# Patient Record
Sex: FEMALE | Race: BLACK OR AFRICAN AMERICAN | NOT HISPANIC OR LATINO | Employment: OTHER | URBAN - METROPOLITAN AREA
[De-identification: names, ages, dates, MRNs, and addresses within clinical notes are randomized per-mention and may not be internally consistent; named-entity substitution may affect disease eponyms.]

---

## 2018-01-19 ENCOUNTER — TELEPHONE (OUTPATIENT)
Dept: CARDIOLOGY | Facility: CLINIC | Age: 83
End: 2018-01-19

## 2018-01-21 ENCOUNTER — APPOINTMENT (OUTPATIENT)
Dept: CT IMAGING | Facility: HOSPITAL | Age: 83
End: 2018-01-21

## 2018-01-21 ENCOUNTER — HOSPITAL ENCOUNTER (EMERGENCY)
Facility: HOSPITAL | Age: 83
Discharge: HOME | End: 2018-01-22
Attending: EMERGENCY MEDICINE | Admitting: EMERGENCY MEDICINE

## 2018-01-21 ENCOUNTER — APPOINTMENT (OUTPATIENT)
Dept: GENERAL RADIOLOGY | Facility: HOSPITAL | Age: 83
End: 2018-01-21

## 2018-01-21 DIAGNOSIS — Z87.440 HISTORY OF URINARY TRACT INFECTION: ICD-10-CM

## 2018-01-21 DIAGNOSIS — R10.84 GENERALIZED ABDOMINAL PAIN: Primary | ICD-10-CM

## 2018-01-21 DIAGNOSIS — Z86.39 HX OF TYPE 2 DIABETES MELLITUS: ICD-10-CM

## 2018-01-21 LAB
ALBUMIN SERPL-MCNC: 3.7 G/DL (ref 3.2–4.8)
ALBUMIN/GLOB SERPL: 0.9 G/DL (ref 1.5–2.5)
ALP SERPL-CCNC: 108 U/L (ref 25–100)
ALT SERPL W P-5'-P-CCNC: 14 U/L (ref 7–40)
ANION GAP SERPL CALCULATED.3IONS-SCNC: 7 MMOL/L (ref 3–11)
AST SERPL-CCNC: 18 U/L (ref 0–33)
BASOPHILS # BLD AUTO: 0.03 10*3/MM3 (ref 0–0.2)
BASOPHILS NFR BLD AUTO: 0.4 % (ref 0–1)
BILIRUB SERPL-MCNC: 0.5 MG/DL (ref 0.3–1.2)
BILIRUB UR QL STRIP: NEGATIVE
BNP SERPL-MCNC: 22 PG/ML (ref 0–100)
BUN BLD-MCNC: 13 MG/DL (ref 9–23)
BUN/CREAT SERPL: 16.3 (ref 7–25)
CALCIUM SPEC-SCNC: 10.1 MG/DL (ref 8.7–10.4)
CHLORIDE SERPL-SCNC: 104 MMOL/L (ref 99–109)
CLARITY UR: CLEAR
CLUMPED PLATELETS: PRESENT
CO2 SERPL-SCNC: 24 MMOL/L (ref 20–31)
COLOR UR: YELLOW
CREAT BLD-MCNC: 0.8 MG/DL (ref 0.6–1.3)
D-LACTATE SERPL-SCNC: 1.8 MMOL/L (ref 0.5–2)
DEPRECATED RDW RBC AUTO: 44.7 FL (ref 37–54)
EOSINOPHIL # BLD AUTO: 0.17 10*3/MM3 (ref 0–0.3)
EOSINOPHIL NFR BLD AUTO: 2 % (ref 0–3)
ERYTHROCYTE [DISTWIDTH] IN BLOOD BY AUTOMATED COUNT: 13.9 % (ref 11.3–14.5)
GFR SERPL CREATININE-BSD FRML MDRD: 83 ML/MIN/1.73
GLOBULIN UR ELPH-MCNC: 4 GM/DL
GLUCOSE BLD-MCNC: 161 MG/DL (ref 70–100)
GLUCOSE UR STRIP-MCNC: NEGATIVE MG/DL
HCT VFR BLD AUTO: 41.8 % (ref 34.5–44)
HGB BLD-MCNC: 13.6 G/DL (ref 11.5–15.5)
HGB UR QL STRIP.AUTO: NEGATIVE
IMM GRANULOCYTES # BLD: 0.04 10*3/MM3 (ref 0–0.03)
IMM GRANULOCYTES NFR BLD: 0.5 % (ref 0–0.6)
KETONES UR QL STRIP: NEGATIVE
LEUKOCYTE ESTERASE UR QL STRIP.AUTO: NEGATIVE
LYMPHOCYTES # BLD AUTO: 1.75 10*3/MM3 (ref 0.6–4.8)
LYMPHOCYTES NFR BLD AUTO: 20.9 % (ref 24–44)
MCH RBC QN AUTO: 28.5 PG (ref 27–31)
MCHC RBC AUTO-ENTMCNC: 32.5 G/DL (ref 32–36)
MCV RBC AUTO: 87.4 FL (ref 80–99)
MONOCYTES # BLD AUTO: 0.72 10*3/MM3 (ref 0–1)
MONOCYTES NFR BLD AUTO: 8.6 % (ref 0–12)
NEUTROPHILS # BLD AUTO: 5.68 10*3/MM3 (ref 1.5–8.3)
NEUTROPHILS NFR BLD AUTO: 67.6 % (ref 41–71)
NITRITE UR QL STRIP: NEGATIVE
PH UR STRIP.AUTO: <=5 [PH] (ref 5–8)
PLATELET # BLD AUTO: 158 10*3/MM3 (ref 150–450)
PMV BLD AUTO: 10.7 FL (ref 6–12)
POTASSIUM BLD-SCNC: 3.8 MMOL/L (ref 3.5–5.5)
PROT SERPL-MCNC: 7.7 G/DL (ref 5.7–8.2)
PROT UR QL STRIP: NEGATIVE
RBC # BLD AUTO: 4.78 10*6/MM3 (ref 3.89–5.14)
RBC MORPH BLD: NORMAL
SODIUM BLD-SCNC: 135 MMOL/L (ref 132–146)
SP GR UR STRIP: 1.02 (ref 1–1.03)
TROPONIN I SERPL-MCNC: <0.006 NG/ML
UROBILINOGEN UR QL STRIP: NORMAL
WBC MORPH BLD: NORMAL
WBC NRBC COR # BLD: 8.39 10*3/MM3 (ref 3.5–10.8)

## 2018-01-21 PROCEDURE — 85007 BL SMEAR W/DIFF WBC COUNT: CPT | Performed by: NURSE PRACTITIONER

## 2018-01-21 PROCEDURE — 83605 ASSAY OF LACTIC ACID: CPT | Performed by: NURSE PRACTITIONER

## 2018-01-21 PROCEDURE — 93005 ELECTROCARDIOGRAM TRACING: CPT | Performed by: EMERGENCY MEDICINE

## 2018-01-21 PROCEDURE — 74177 CT ABD & PELVIS W/CONTRAST: CPT

## 2018-01-21 PROCEDURE — 25010000002 DIPHENHYDRAMINE PER 50 MG: Performed by: NURSE PRACTITIONER

## 2018-01-21 PROCEDURE — 84484 ASSAY OF TROPONIN QUANT: CPT | Performed by: EMERGENCY MEDICINE

## 2018-01-21 PROCEDURE — 85025 COMPLETE CBC W/AUTO DIFF WBC: CPT | Performed by: NURSE PRACTITIONER

## 2018-01-21 PROCEDURE — 87040 BLOOD CULTURE FOR BACTERIA: CPT | Performed by: NURSE PRACTITIONER

## 2018-01-21 PROCEDURE — 99284 EMERGENCY DEPT VISIT MOD MDM: CPT

## 2018-01-21 PROCEDURE — 0 IOPAMIDOL 61 % SOLUTION: Performed by: EMERGENCY MEDICINE

## 2018-01-21 PROCEDURE — 96374 THER/PROPH/DIAG INJ IV PUSH: CPT

## 2018-01-21 PROCEDURE — 96361 HYDRATE IV INFUSION ADD-ON: CPT

## 2018-01-21 PROCEDURE — 80053 COMPREHEN METABOLIC PANEL: CPT | Performed by: NURSE PRACTITIONER

## 2018-01-21 PROCEDURE — 71046 X-RAY EXAM CHEST 2 VIEWS: CPT

## 2018-01-21 PROCEDURE — 81003 URINALYSIS AUTO W/O SCOPE: CPT | Performed by: NURSE PRACTITIONER

## 2018-01-21 PROCEDURE — 83880 ASSAY OF NATRIURETIC PEPTIDE: CPT | Performed by: NURSE PRACTITIONER

## 2018-01-21 RX ORDER — SODIUM CHLORIDE 0.9 % (FLUSH) 0.9 %
10 SYRINGE (ML) INJECTION AS NEEDED
Status: DISCONTINUED | OUTPATIENT
Start: 2018-01-21 | End: 2018-01-22 | Stop reason: HOSPADM

## 2018-01-21 RX ORDER — DIPHENHYDRAMINE HYDROCHLORIDE 50 MG/ML
25 INJECTION INTRAMUSCULAR; INTRAVENOUS EVERY 6 HOURS PRN
Status: DISCONTINUED | OUTPATIENT
Start: 2018-01-21 | End: 2018-01-22 | Stop reason: HOSPADM

## 2018-01-21 RX ADMIN — IOPAMIDOL 85 ML: 612 INJECTION, SOLUTION INTRAVENOUS at 21:00

## 2018-01-21 RX ADMIN — DIPHENHYDRAMINE HYDROCHLORIDE 25 MG: 50 INJECTION INTRAMUSCULAR; INTRAVENOUS at 22:13

## 2018-01-21 RX ADMIN — SODIUM CHLORIDE 1000 ML: 9 INJECTION, SOLUTION INTRAVENOUS at 20:06

## 2018-01-22 VITALS
WEIGHT: 154 LBS | DIASTOLIC BLOOD PRESSURE: 73 MMHG | SYSTOLIC BLOOD PRESSURE: 123 MMHG | HEART RATE: 87 BPM | OXYGEN SATURATION: 91 % | HEIGHT: 60 IN | TEMPERATURE: 98.4 F | BODY MASS INDEX: 30.23 KG/M2 | RESPIRATION RATE: 20 BRPM

## 2018-01-22 DIAGNOSIS — R00.2 HEART PALPITATIONS: ICD-10-CM

## 2018-01-22 DIAGNOSIS — I47.1 SVT (SUPRAVENTRICULAR TACHYCARDIA) (HCC): Primary | ICD-10-CM

## 2018-01-22 NOTE — ED PROVIDER NOTES
Subjective   HPI Comments: Nancy Trejo is a 83 y.o.female who presents to the ED with c/o abdominal pain with onset 3 days ago. She reports that she developed severe epigastric abd pain described as a soreness. She rates her abd pain as a 5/10 in severity. She states that she has been diagnosed with a UTI, 2 days ago, secondary to her abd pain. She also complains of SOA with onset 3 days ago and dysuria but denies appetite change, fever, CP, N/V/D, constipation, or any other complaints at this time. She states that she was prescribed medication for her diagnosed UTI but notes that her treatment has been ineffective. She states that she has hx of  and hysterectomy.    Patient is a 83 y.o. female presenting with abdominal pain.   History provided by:  Patient  Abdominal Pain   Pain location:  Epigastric  Pain quality: aching    Pain radiates to:  Does not radiate  Pain severity:  Moderate  Onset quality:  Gradual  Timing:  Constant  Progression:  Worsening  Chronicity:  New  Relieved by:  None tried  Worsened by:  Nothing  Ineffective treatments:  None tried  Associated symptoms: dysuria and shortness of breath    Associated symptoms: no chest pain, no constipation, no diarrhea, no fever, no nausea and no vomiting        Review of Systems   Constitutional: Negative for appetite change and fever.   Respiratory: Positive for shortness of breath.    Cardiovascular: Negative for chest pain.   Gastrointestinal: Positive for abdominal pain. Negative for constipation, diarrhea, nausea and vomiting.   Genitourinary: Positive for dysuria.   All other systems reviewed and are negative.      Past Medical History:   Diagnosis Date   • Diabetes mellitus    • H/O chest x-ray 2014    no active disease   • H/O chest x-ray 2014    Trachea is midline. Mild interstitial disease versus pulmonary congestion in bilateral bases, right greater than left which does not appear to be greatly changes from prev exam  12.The remaider of lungs are grossly clear. There is a left subclavian Port- A cath noted. Diaphragms are flattened consistent w/ COPD. degenrative changes of thoracolumbar spine   • H/O chest x-ray 2013    Lung hyperinflated w/ flattened diaphragms consistent w/ COPD. Cardiac silhoutte at upper limits of normal. Port A Cath overlying left chest that appears to be entering left subclavian vein & ends at cavoatrial junction. Scattered Pulmonary nodules, primarily right hilar region, consistent w/ prio granulomatous infection. Lungs grossly clear of dominant nodules, infiltrates or massesNSCfrom2012   • History of PFTs 2014    Moderate to severe obstruction with BD response. positive air trapping, DLCO decreased   • History of PFTs 2013    Moderate obstruction, no ABDR   • History of PFTs 2012    PFT acceptable and reproducible. Pt given 3 puffs xopenex. Pt had difficult time panting during plethysmography, attempted 2 times,pt  unable to complete. DLCO best effort. Best of pt ability       No Known Allergies    Past Surgical History:   Procedure Laterality Date   •  SECTION     • HYSTERECTOMY      Removal of both ovaries-secondary to uterine  cancer       History reviewed. No pertinent family history.    Social History     Social History   • Marital status: Single     Spouse name: N/A   • Number of children: N/A   • Years of education: N/A     Social History Main Topics   • Smoking status: Former Smoker     Packs/day: 1.00     Years: 50.00     Types: Cigarettes     Quit date: 2012   • Smokeless tobacco: None   • Alcohol use No      Comment: PT REPORTS NOT DRINKING SINCE    • Drug use: No   • Sexual activity: Defer     Other Topics Concern   • None     Social History Narrative   • None         Objective   Physical Exam   Constitutional: She is oriented to person, place, and time. She appears well-developed and well-nourished. No distress.   HENT:   Head: Normocephalic and  atraumatic.   Nose: Nose normal.   Eyes: Conjunctivae are normal. No scleral icterus.   Neck: Normal range of motion. Neck supple.   Cardiovascular: Normal rate, regular rhythm and normal heart sounds.    No murmur heard.  Pulmonary/Chest: Effort normal and breath sounds normal. No respiratory distress.   Lungs are clear.   Abdominal: Soft. Bowel sounds are normal. There is no tenderness.   The pt reports subjective mild diffuse pain. No tenderness upon examination. No CVA tenderness.   Musculoskeletal: She exhibits no edema.   Neurological: She is alert and oriented to person, place, and time.   Skin: Skin is warm and dry.   Psychiatric: She has a normal mood and affect. Her behavior is normal.   Nursing note and vitals reviewed.      Procedures         ED Course  ED Course   Comment By Time   The pt developed a red itchy patch on her left cheek that is slightly pruritic. This errupted shortly after her IV dye procedure. The pt does not have any respiratory distress. Vital signs are stable. Jose Cintron 01/21 2118   Spoke to Dr. Santana about the pt. He feels that the pt is safe to be discharged. Jose Cintron 01/21 4390     Recent Results (from the past 24 hour(s))   Comprehensive Metabolic Panel    Collection Time: 01/21/18  8:01 PM   Result Value Ref Range    Glucose 161 (H) 70 - 100 mg/dL    BUN 13 9 - 23 mg/dL    Creatinine 0.80 0.60 - 1.30 mg/dL    Sodium 135 132 - 146 mmol/L    Potassium 3.8 3.5 - 5.5 mmol/L    Chloride 104 99 - 109 mmol/L    CO2 24.0 20.0 - 31.0 mmol/L    Calcium 10.1 8.7 - 10.4 mg/dL    Total Protein 7.7 5.7 - 8.2 g/dL    Albumin 3.70 3.20 - 4.80 g/dL    ALT (SGPT) 14 7 - 40 U/L    AST (SGOT) 18 0 - 33 U/L    Alkaline Phosphatase 108 (H) 25 - 100 U/L    Total Bilirubin 0.5 0.3 - 1.2 mg/dL    eGFR  African Amer 83 >60 mL/min/1.73    Globulin 4.0 gm/dL    A/G Ratio 0.9 (L) 1.5 - 2.5 g/dL    BUN/Creatinine Ratio 16.3 7.0 - 25.0    Anion Gap 7.0 3.0 - 11.0 mmol/L   BNP     Collection Time: 01/21/18  8:01 PM   Result Value Ref Range    BNP 22.0 0.0 - 100.0 pg/mL   Lactic Acid, Plasma    Collection Time: 01/21/18  8:01 PM   Result Value Ref Range    Lactate 1.8 0.5 - 2.0 mmol/L   CBC Auto Differential    Collection Time: 01/21/18  8:01 PM   Result Value Ref Range    WBC 8.39 3.50 - 10.80 10*3/mm3    RBC 4.78 3.89 - 5.14 10*6/mm3    Hemoglobin 13.6 11.5 - 15.5 g/dL    Hematocrit 41.8 34.5 - 44.0 %    MCV 87.4 80.0 - 99.0 fL    MCH 28.5 27.0 - 31.0 pg    MCHC 32.5 32.0 - 36.0 g/dL    RDW 13.9 11.3 - 14.5 %    RDW-SD 44.7 37.0 - 54.0 fl    MPV 10.7 6.0 - 12.0 fL    Platelets 158 150 - 450 10*3/mm3    Neutrophil % 67.6 41.0 - 71.0 %    Lymphocyte % 20.9 (L) 24.0 - 44.0 %    Monocyte % 8.6 0.0 - 12.0 %    Eosinophil % 2.0 0.0 - 3.0 %    Basophil % 0.4 0.0 - 1.0 %    Immature Grans % 0.5 0.0 - 0.6 %    Neutrophils, Absolute 5.68 1.50 - 8.30 10*3/mm3    Lymphocytes, Absolute 1.75 0.60 - 4.80 10*3/mm3    Monocytes, Absolute 0.72 0.00 - 1.00 10*3/mm3    Eosinophils, Absolute 0.17 0.00 - 0.30 10*3/mm3    Basophils, Absolute 0.03 0.00 - 0.20 10*3/mm3    Immature Grans, Absolute 0.04 (H) 0.00 - 0.03 10*3/mm3   Scan Slide    Collection Time: 01/21/18  8:01 PM   Result Value Ref Range    RBC Morphology Normal Normal    WBC Morphology Normal Normal    Clumped Platelets Present None Seen   Troponin    Collection Time: 01/21/18  8:01 PM   Result Value Ref Range    Troponin I <0.006 <=0.039 ng/mL   Urinalysis With / Culture If Indicated - Urine, Catheter    Collection Time: 01/21/18  8:12 PM   Result Value Ref Range    Color, UA Yellow Yellow, Straw    Appearance, UA Clear Clear    pH, UA <=5.0 5.0 - 8.0    Specific Gravity, UA 1.016 1.001 - 1.030    Glucose, UA Negative Negative    Ketones, UA Negative Negative    Bilirubin, UA Negative Negative    Blood, UA Negative Negative    Protein, UA Negative Negative    Leuk Esterase, UA Negative Negative    Nitrite, UA Negative Negative    Urobilinogen, UA 0.2  "E.U./dL 0.2 - 1.0 E.U./dL     Note: In addition to lab results from this visit, the labs listed above may include labs taken at another facility or during a different encounter within the last 24 hours. Please correlate lab times with ED admission and discharge times for further clarification of the services performed during this visit.    CT Abdomen Pelvis With Contrast   Final Result   1.  Consider US to rule out high density GB bile versus gravel cholelithiasis.   2.  Unruptured aneurysms of abdominal aorta and thoracic aorta.   3.  Nonspecific bowel gas pattern without dilatation or obstruction.   4.  Colonic diverticulosis.   5.  Small left renal nodule.   6.  Small cyst or dilated ductal segment at pancreatic head.  Consider US.   7.  Mild left inguinal lymphadenopathy.      THIS DOCUMENT HAS BEEN ELECTRONICALLY SIGNED BY JAKOB VOGT MD      XR Chest 2 View   Final Result     Chronic cardiopulmonary changes without evidence of an active lung    parenchymal lesion.         Tortuous thoracic aorta with probable aneurysm of the aortic arch/proximal    thoracic aorta.       THIS DOCUMENT HAS BEEN ELECTRONICALLY SIGNED BY SWATI BLACK MD        Vitals:    01/21/18 1927 01/21/18 2015 01/21/18 2114 01/21/18 2130   BP: (!) 141/109 147/82 131/69 150/63   BP Location: Left arm      Patient Position: Lying      Pulse: 91 81  83   Resp: 20      Temp: 98.4 °F (36.9 °C)      TempSrc: Oral      SpO2: 93% 97%  95%   Weight: 69.9 kg (154 lb)      Height: 152.4 cm (60\")        Medications   sodium chloride 0.9 % flush 10 mL (not administered)   diphenhydrAMINE (BENADRYL) injection 25 mg (25 mg Intravenous Given 1/21/18 2213)   sodium chloride 0.9 % bolus 1,000 mL (0 mL Intravenous Stopped 1/21/18 2106)   iopamidol (ISOVUE-300) 61 % injection 100 mL (85 mL Intravenous Given 1/21/18 2100)     ECG/EMG Results (last 24 hours)     Procedure Component Value Units Date/Time    ECG 12 Lead [[id number]] Collected:  01/21/18 1931     " Updated:  01/21/18 1933                        [facility]    Final diagnoses:   Generalized abdominal pain   History of urinary tract infection   Hx of type 2 diabetes mellitus       Documentation assistance provided by lizbet Cintron.  Information recorded by the catrachoibjulissa was done at my direction and has been verified and validated by me.     Jose Cintron  01/21/18 2007       Lexy Puentes, KATHI  01/21/18 2447

## 2018-01-22 NOTE — DISCHARGE INSTRUCTIONS
Continue the antibiotics by mouth for the recent urine infection      Discuss the abnormal findings on your aorta with your cardiologist tomorrow and follow up with vascular surgeon, Dr. Green to observe and monitor these findings routinely.      Return to the ER as needed for worsening symptoms or concerns.  Thank You

## 2018-01-26 LAB
BACTERIA SPEC AEROBE CULT: NORMAL
BACTERIA SPEC AEROBE CULT: NORMAL

## 2018-01-31 ENCOUNTER — APPOINTMENT (OUTPATIENT)
Dept: CT IMAGING | Facility: HOSPITAL | Age: 83
End: 2018-01-31

## 2018-01-31 ENCOUNTER — APPOINTMENT (OUTPATIENT)
Dept: GENERAL RADIOLOGY | Facility: HOSPITAL | Age: 83
End: 2018-01-31

## 2018-01-31 ENCOUNTER — HOSPITAL ENCOUNTER (EMERGENCY)
Facility: HOSPITAL | Age: 83
Discharge: HOME | End: 2018-01-31
Attending: EMERGENCY MEDICINE | Admitting: EMERGENCY MEDICINE

## 2018-01-31 ENCOUNTER — APPOINTMENT (OUTPATIENT)
Dept: ULTRASOUND IMAGING | Facility: HOSPITAL | Age: 83
End: 2018-01-31

## 2018-01-31 VITALS
DIASTOLIC BLOOD PRESSURE: 78 MMHG | SYSTOLIC BLOOD PRESSURE: 128 MMHG | RESPIRATION RATE: 20 BRPM | BODY MASS INDEX: 29.23 KG/M2 | WEIGHT: 165 LBS | HEART RATE: 76 BPM | OXYGEN SATURATION: 96 % | HEIGHT: 63 IN | TEMPERATURE: 97.4 F

## 2018-01-31 DIAGNOSIS — M79.2 NEUROPATHIC PAIN: Primary | ICD-10-CM

## 2018-01-31 LAB
ALBUMIN SERPL-MCNC: 3.8 G/DL (ref 3.2–4.8)
ALBUMIN/GLOB SERPL: 1 G/DL (ref 1.5–2.5)
ALP SERPL-CCNC: 90 U/L (ref 25–100)
ALT SERPL W P-5'-P-CCNC: 17 U/L (ref 7–40)
ANION GAP SERPL CALCULATED.3IONS-SCNC: 10 MMOL/L (ref 3–11)
AST SERPL-CCNC: 20 U/L (ref 0–33)
BASOPHILS # BLD AUTO: 0.03 10*3/MM3 (ref 0–0.2)
BASOPHILS NFR BLD AUTO: 0.4 % (ref 0–1)
BILIRUB SERPL-MCNC: 0.6 MG/DL (ref 0.3–1.2)
BILIRUB UR QL STRIP: NEGATIVE
BUN BLD-MCNC: 8 MG/DL (ref 9–23)
BUN/CREAT SERPL: 11.4 (ref 7–25)
CALCIUM SPEC-SCNC: 9.9 MG/DL (ref 8.7–10.4)
CHLORIDE SERPL-SCNC: 101 MMOL/L (ref 99–109)
CLARITY UR: CLEAR
CO2 SERPL-SCNC: 24 MMOL/L (ref 20–31)
COLOR UR: YELLOW
CREAT BLD-MCNC: 0.7 MG/DL (ref 0.6–1.3)
DEPRECATED RDW RBC AUTO: 45.1 FL (ref 37–54)
EOSINOPHIL # BLD AUTO: 0.06 10*3/MM3 (ref 0–0.3)
EOSINOPHIL NFR BLD AUTO: 0.8 % (ref 0–3)
ERYTHROCYTE [DISTWIDTH] IN BLOOD BY AUTOMATED COUNT: 14.2 % (ref 11.3–14.5)
GFR SERPL CREATININE-BSD FRML MDRD: 97 ML/MIN/1.73
GLOBULIN UR ELPH-MCNC: 3.7 GM/DL
GLUCOSE BLD-MCNC: 108 MG/DL (ref 70–100)
GLUCOSE UR STRIP-MCNC: NEGATIVE MG/DL
HCT VFR BLD AUTO: 43 % (ref 34.5–44)
HGB BLD-MCNC: 14.2 G/DL (ref 11.5–15.5)
HGB UR QL STRIP.AUTO: NEGATIVE
HOLD SPECIMEN: NORMAL
HOLD SPECIMEN: NORMAL
IMM GRANULOCYTES # BLD: 0.02 10*3/MM3 (ref 0–0.03)
IMM GRANULOCYTES NFR BLD: 0.3 % (ref 0–0.6)
KETONES UR QL STRIP: NEGATIVE
LEUKOCYTE ESTERASE UR QL STRIP.AUTO: NEGATIVE
LIPASE SERPL-CCNC: 27 U/L (ref 6–51)
LYMPHOCYTES # BLD AUTO: 1.96 10*3/MM3 (ref 0.6–4.8)
LYMPHOCYTES NFR BLD AUTO: 25 % (ref 24–44)
MCH RBC QN AUTO: 28.9 PG (ref 27–31)
MCHC RBC AUTO-ENTMCNC: 33 G/DL (ref 32–36)
MCV RBC AUTO: 87.6 FL (ref 80–99)
MONOCYTES # BLD AUTO: 0.77 10*3/MM3 (ref 0–1)
MONOCYTES NFR BLD AUTO: 9.8 % (ref 0–12)
NEUTROPHILS # BLD AUTO: 4.99 10*3/MM3 (ref 1.5–8.3)
NEUTROPHILS NFR BLD AUTO: 63.7 % (ref 41–71)
NITRITE UR QL STRIP: NEGATIVE
PH UR STRIP.AUTO: 6 [PH] (ref 5–8)
PLATELET # BLD AUTO: 260 10*3/MM3 (ref 150–450)
PMV BLD AUTO: 10 FL (ref 6–12)
POTASSIUM BLD-SCNC: 3.9 MMOL/L (ref 3.5–5.5)
PROT SERPL-MCNC: 7.5 G/DL (ref 5.7–8.2)
PROT UR QL STRIP: NEGATIVE
RBC # BLD AUTO: 4.91 10*6/MM3 (ref 3.89–5.14)
SODIUM BLD-SCNC: 135 MMOL/L (ref 132–146)
SP GR UR STRIP: 1.01 (ref 1–1.03)
TROPONIN I SERPL-MCNC: <0.006 NG/ML
UROBILINOGEN UR QL STRIP: NORMAL
WBC NRBC COR # BLD: 7.83 10*3/MM3 (ref 3.5–10.8)
WHOLE BLOOD HOLD SPECIMEN: NORMAL
WHOLE BLOOD HOLD SPECIMEN: NORMAL

## 2018-01-31 PROCEDURE — 84484 ASSAY OF TROPONIN QUANT: CPT | Performed by: EMERGENCY MEDICINE

## 2018-01-31 PROCEDURE — 71045 X-RAY EXAM CHEST 1 VIEW: CPT

## 2018-01-31 PROCEDURE — 80053 COMPREHEN METABOLIC PANEL: CPT | Performed by: EMERGENCY MEDICINE

## 2018-01-31 PROCEDURE — 76705 ECHO EXAM OF ABDOMEN: CPT

## 2018-01-31 PROCEDURE — 99284 EMERGENCY DEPT VISIT MOD MDM: CPT

## 2018-01-31 PROCEDURE — 93005 ELECTROCARDIOGRAM TRACING: CPT | Performed by: EMERGENCY MEDICINE

## 2018-01-31 PROCEDURE — 81003 URINALYSIS AUTO W/O SCOPE: CPT | Performed by: EMERGENCY MEDICINE

## 2018-01-31 PROCEDURE — 93005 ELECTROCARDIOGRAM TRACING: CPT

## 2018-01-31 PROCEDURE — 70450 CT HEAD/BRAIN W/O DYE: CPT

## 2018-01-31 PROCEDURE — 85025 COMPLETE CBC W/AUTO DIFF WBC: CPT | Performed by: EMERGENCY MEDICINE

## 2018-01-31 PROCEDURE — 83690 ASSAY OF LIPASE: CPT | Performed by: EMERGENCY MEDICINE

## 2018-01-31 RX ORDER — SODIUM CHLORIDE 0.9 % (FLUSH) 0.9 %
10 SYRINGE (ML) INJECTION AS NEEDED
Status: DISCONTINUED | OUTPATIENT
Start: 2018-01-31 | End: 2018-01-31 | Stop reason: HOSPADM

## 2018-01-31 RX ORDER — GABAPENTIN 300 MG/1
300 CAPSULE ORAL 2 TIMES DAILY
Qty: 30 CAPSULE | Refills: 0 | Status: SHIPPED | OUTPATIENT
Start: 2018-01-31

## 2018-02-07 ENCOUNTER — OFFICE VISIT (OUTPATIENT)
Dept: CARDIOLOGY | Facility: CLINIC | Age: 83
End: 2018-02-07

## 2018-02-07 ENCOUNTER — HOSPITAL ENCOUNTER (OUTPATIENT)
Dept: CARDIOLOGY | Facility: HOSPITAL | Age: 83
Discharge: HOME | End: 2018-02-07
Attending: INTERNAL MEDICINE | Admitting: INTERNAL MEDICINE

## 2018-02-07 VITALS
HEIGHT: 63 IN | BODY MASS INDEX: 28.53 KG/M2 | SYSTOLIC BLOOD PRESSURE: 128 MMHG | DIASTOLIC BLOOD PRESSURE: 70 MMHG | HEART RATE: 72 BPM | WEIGHT: 161 LBS

## 2018-02-07 DIAGNOSIS — I10 ESSENTIAL HYPERTENSION: ICD-10-CM

## 2018-02-07 DIAGNOSIS — E78.00 HYPERCHOLESTEROLEMIA: ICD-10-CM

## 2018-02-07 DIAGNOSIS — R00.2 HEART PALPITATIONS: ICD-10-CM

## 2018-02-07 DIAGNOSIS — R00.0 TACHYCARDIA: Primary | ICD-10-CM

## 2018-02-07 DIAGNOSIS — I71.40 ABDOMINAL AORTIC ANEURYSM (AAA) WITHOUT RUPTURE (HCC): ICD-10-CM

## 2018-02-07 LAB
BH CV ECHO MEAS - AO ROOT AREA (BSA CORRECTED): 1.5
BH CV ECHO MEAS - AO ROOT AREA: 5.7 CM^2
BH CV ECHO MEAS - AO ROOT DIAM: 2.7 CM
BH CV ECHO MEAS - BSA(HAYCOCK): 1.8 M^2
BH CV ECHO MEAS - BSA: 1.8 M^2
BH CV ECHO MEAS - BZI_BMI: 29.2 KILOGRAMS/M^2
BH CV ECHO MEAS - BZI_METRIC_HEIGHT: 160 CM
BH CV ECHO MEAS - BZI_METRIC_WEIGHT: 74.8 KG
BH CV ECHO MEAS - CONTRAST EF (2CH): 42.9 ML/M^2
BH CV ECHO MEAS - CONTRAST EF 4CH: 41.5 ML/M^2
BH CV ECHO MEAS - EDV(CUBED): 41.8 ML
BH CV ECHO MEAS - EDV(MOD-SP2): 56 ML
BH CV ECHO MEAS - EDV(MOD-SP4): 53 ML
BH CV ECHO MEAS - EDV(TEICH): 49.8 ML
BH CV ECHO MEAS - EF(CUBED): 45.8 %
BH CV ECHO MEAS - EF(MOD-SP2): 42.9 %
BH CV ECHO MEAS - EF(TEICH): 39.1 %
BH CV ECHO MEAS - ESV(CUBED): 22.7 ML
BH CV ECHO MEAS - ESV(MOD-SP2): 32 ML
BH CV ECHO MEAS - ESV(MOD-SP4): 31 ML
BH CV ECHO MEAS - ESV(TEICH): 30.3 ML
BH CV ECHO MEAS - FS: 18.4 %
BH CV ECHO MEAS - IVS/LVPW: 1
BH CV ECHO MEAS - IVSD: 0.96 CM
BH CV ECHO MEAS - LA DIMENSION: 2.7 CM
BH CV ECHO MEAS - LA/AO: 1
BH CV ECHO MEAS - LAT PEAK E' VEL: 6.8 CM/SEC
BH CV ECHO MEAS - LV DIASTOLIC VOL/BSA (35-75): 29.7 ML/M^2
BH CV ECHO MEAS - LV MASS(C)D: 96.7 GRAMS
BH CV ECHO MEAS - LV MASS(C)DI: 54.3 GRAMS/M^2
BH CV ECHO MEAS - LV SYSTOLIC VOL/BSA (12-30): 17.4 ML/M^2
BH CV ECHO MEAS - LVIDD: 3.5 CM
BH CV ECHO MEAS - LVIDS: 2.8 CM
BH CV ECHO MEAS - LVLD AP2: 6.6 CM
BH CV ECHO MEAS - LVLD AP4: 6.3 CM
BH CV ECHO MEAS - LVLS AP2: 5.7 CM
BH CV ECHO MEAS - LVLS AP4: 6.3 CM
BH CV ECHO MEAS - LVPWD: 0.96 CM
BH CV ECHO MEAS - MED PEAK E' VEL: 4.5 CM/SEC
BH CV ECHO MEAS - MV A MAX VEL: 93.3 CM/SEC
BH CV ECHO MEAS - MV DEC TIME: 0.39 SEC
BH CV ECHO MEAS - MV E MAX VEL: 50.8 CM/SEC
BH CV ECHO MEAS - MV E/A: 0.54
BH CV ECHO MEAS - RAP SYSTOLE: 5 MMHG
BH CV ECHO MEAS - RVDD: 2.3 CM
BH CV ECHO MEAS - RVSP: 34 MMHG
BH CV ECHO MEAS - SI(CUBED): 10.7 ML/M^2
BH CV ECHO MEAS - SI(MOD-SP2): 13.5 ML/M^2
BH CV ECHO MEAS - SI(MOD-SP4): 12.3 ML/M^2
BH CV ECHO MEAS - SI(TEICH): 10.9 ML/M^2
BH CV ECHO MEAS - SV(CUBED): 19.1 ML
BH CV ECHO MEAS - SV(MOD-SP2): 24 ML
BH CV ECHO MEAS - SV(MOD-SP4): 22 ML
BH CV ECHO MEAS - SV(TEICH): 19.5 ML
BH CV ECHO MEAS - TAPSE (>1.6): 1.8 CM2
BH CV ECHO MEAS - TR MAX VEL: 270 CM/SEC
BH CV VAS BP RIGHT ARM: NORMAL MMHG
BH CV XLRA - RV BASE: 3.1 CM
BH CV XLRA - RV LENGTH: 5.9 CM
BH CV XLRA - RV MID: 2.5 CM
E/E' RATIO: 5.6
MAXIMAL PREDICTED HEART RATE: 137 BPM
STRESS TARGET HR: 116 BPM

## 2018-02-07 PROCEDURE — 93306 TTE W/DOPPLER COMPLETE: CPT

## 2018-02-07 PROCEDURE — 25010000002 SULFUR HEXAFLUORIDE MICROSPH 60.7-25 MG RECONSTITUTED SUSPENSION: Performed by: INTERNAL MEDICINE

## 2018-02-07 PROCEDURE — 99204 OFFICE O/P NEW MOD 45 MIN: CPT | Performed by: INTERNAL MEDICINE

## 2018-02-07 PROCEDURE — 93306 TTE W/DOPPLER COMPLETE: CPT | Performed by: INTERNAL MEDICINE

## 2018-02-07 RX ORDER — LEVOFLOXACIN 500 MG/1
TABLET, FILM COATED ORAL
Refills: 0 | COMMUNITY
Start: 2018-01-18 | End: 2018-03-19

## 2018-02-07 RX ORDER — METOPROLOL SUCCINATE 25 MG/1
TABLET, EXTENDED RELEASE ORAL
Refills: 0 | COMMUNITY
Start: 2018-01-19 | End: 2018-03-19 | Stop reason: SDUPTHER

## 2018-02-07 RX ORDER — ASPIRIN 81 MG/1
81 TABLET ORAL DAILY
Start: 2018-02-07

## 2018-02-07 RX ORDER — NITROFURANTOIN 25; 75 MG/1; MG/1
CAPSULE ORAL
Refills: 0 | COMMUNITY
Start: 2018-01-10 | End: 2018-03-19

## 2018-02-07 NOTE — PROGRESS NOTES
[facility] at [facility]  Consultation H&P  Nancy Trejo  1934  [phone]    VISIT DATE:  02/07/2018    PCP: ALEKSANDRA Anders MD  [facility] [address]    CC:  Chief Complaint   Patient presents with   • Chest Pain     New Patient   • Shortness of Breath   • Irregular Heart Beat       ASSESSMENT:   Diagnosis Plan   1. Tachycardia  Cardiac Event Monitor   2. Essential hypertension     3. Hypercholesterolemia     4. Abdominal aortic aneurysm (AAA) without rupture         PLAN:  Starting aspirin 81 mg by mouth daily for primary prevention of stroke and myocardial infarction  Continue beta-blockade  30 day event monitor to screen for underlying atrial fibrillation and atrial flutter  Follow-up abdominal ultrasound in 6 months to verify stability of small abdominal aortic aneurysm, then annually afterward  Continue statin  Afterload currently well-controlled.    History of Present Illness   83-year-old female was recently noted to have resting tachycardia upon evaluation her primary care physician's office.  Heart rates are running in the 120 to 1:30 beat per minute range.  She was essentially completely asymptomatic.  She denies palpitations, chest pain or shortness of breath.  She recently had issues with abdominal discomfort and diffuse body aches for which evaluation was unremarkable.  CT abdomen did reveal a small abdominal aortic aneurysm with associated mural thrombus.  She has a previous 40-pack-year history of smoking.  Blood pressures running less than 140/90 mmHg.  Reviewed transthoracic echo today which did not reveal any hemodynamically significant valvular heart disease and revealed preserved left ventricular systolic function.  Holter monitor all are revealed frequent premature atrial contractions with otherwise normal resting sinus rate.    PHYSICAL EXAMINATION:  Vitals:    02/07/18 1252   BP: 128/70   BP Location: Right arm   Patient Position: Sitting  "  Pulse: 72   Weight: 73 kg (161 lb)   Height: 160 cm (63\")     General Appearance:    Alert, cooperative, no distress, appears stated age   Head:    Normocephalic, without obvious abnormality, atraumatic   Eyes:    conjunctiva/corneas clear, EOM's intact, fundi     benign, both eyes   Ears:    Normal TM's and external ear canals, both ears   Nose:   Nares normal, septum midline, mucosa normal, no drainage    or sinus tenderness   Throat:   Lips, mucosa, and tongue normal; teeth and gums normal   Neck:   Supple, symmetrical, trachea midline, no adenopathy;     thyroid:  no enlargement/tenderness/nodules; no carotid    bruit or JVD   Back:     Symmetric, no curvature, ROM normal, no CVA tenderness   Lungs:     Clear to auscultation bilaterally, respirations unlabored   Chest Wall:    No tenderness or deformity    Heart:    Regular rate and rhythm, S1 and S2 normal, no murmur, rub   or gallop, normal carotid impulse bilaterally without bruit.   Abdomen:     Soft, non-tender, bowel sounds active all four quadrants,     no masses, no organomegaly   Extremities:   Extremities normal, atraumatic, no cyanosis or edema   Pulses:   2+ and symmetric all extremities   Skin:   Skin color, texture, turgor normal, no rashes or lesions   Lymph nodes:   Cervical, supraclavicular, and axillary nodes normal   Neurologic:   normal strength, sensation intact     throughout       Diagnostic Data:  Procedures  No results found for: CHLPL, TRIG, HDL, LDLDIRECT  Lab Results   Component Value Date    GLUCOSE 108 (H) 01/31/2018    BUN 8 (L) 01/31/2018    CREATININE 0.70 01/31/2018     01/31/2018    K 3.9 01/31/2018     01/31/2018    CO2 24.0 01/31/2018     No results found for: HGBA1C  Lab Results   Component Value Date    WBC 7.83 01/31/2018    HGB 14.2 01/31/2018    HCT 43.0 01/31/2018     01/31/2018       PROBLEM LIST:  Patient Active Problem List   Diagnosis   • COPD (chronic obstructive pulmonary disease)   • " Dependence on supplemental oxygen   • Diabetes mellitus   • Hypercholesterolemia   • Hypertension   • Uterine cancer   • Post-nasal drip   • Cough   • Tachycardia   • AAA (abdominal aortic aneurysm)       PAST MEDICAL HX  Past Medical History:   Diagnosis Date   • Diabetes mellitus    • H/O chest x-ray 07/22/2014    no active disease   • H/O chest x-ray 02/24/2014    Trachea is midline. Mild interstitial disease versus pulmonary congestion in bilateral bases, right greater than left which does not appear to be greatly changes from prev exam 08/01/12.The remaider of lungs are grossly clear. There is a left subclavian Port- A cath noted. Diaphragms are flattened consistent w/ COPD. degenrative changes of thoracolumbar spine   • H/O chest x-ray 02/18/2013    Lung hyperinflated w/ flattened diaphragms consistent w/ COPD. Cardiac silhoutte at upper limits of normal. Port A Cath overlying left chest that appears to be entering left subclavian vein & ends at cavoatrial junction. Scattered Pulmonary nodules, primarily right hilar region, consistent w/ prio granulomatous infection. Lungs grossly clear of dominant nodules, infiltrates or massesNSCfrom6/2012   • History of PFTs 02/24/2014    Moderate to severe obstruction with BD response. positive air trapping, DLCO decreased   • History of PFTs 02/18/2013    Moderate obstruction, no ABDR   • History of PFTs 06/28/2012    PFT acceptable and reproducible. Pt given 3 puffs xopenex. Pt had difficult time panting during plethysmography, attempted 2 times,pt  unable to complete. DLCO best effort. Best of pt ability       Allergies  No Known Allergies    Current Medications    Current Outpatient Prescriptions:   •  ADVAIR DISKUS 100-50 MCG/DOSE DISKUS, INL 1 PUFF PO BID, Disp: , Rfl: 5  •  citalopram (CeleXA) 10 MG tablet, TK 1 T PO QD, Disp: , Rfl: 2  •  fluticasone-salmeterol (ADVAIR) 250-50 MCG/DOSE DISKUS, Inhale 1 puff 2 (two) times a day., Disp: 60 each, Rfl: 11  •   gabapentin (NEURONTIN) 300 MG capsule, Take 1 capsule by mouth 2 (Two) Times a Day., Disp: 30 capsule, Rfl: 0  •  guaiFENesin (MUCINEX) 600 MG 12 hr tablet, Take  by mouth 2 (two) times a day., Disp: , Rfl:   •  hydrochlorothiazide (HYDRODIURIL) 25 MG tablet, Take  by mouth., Disp: , Rfl:   •  levoFLOXacin (LEVAQUIN) 500 MG tablet, TK 1 T PO D FOR 7 DAYS, Disp: , Rfl: 0  •  metFORMIN XR (GLUCOPHAGE-XR) 500 MG 24 hr tablet, daily., Disp: , Rfl: 1  •  metoprolol succinate XL (TOPROL-XL) 25 MG 24 hr tablet, TK 1 T PO D, Disp: , Rfl: 0  •  metoprolol tartrate (LOPRESSOR) 50 MG tablet, Take  by mouth daily., Disp: , Rfl:   •  pravastatin (PRAVACHOL) 40 MG tablet, Take  by mouth daily., Disp: , Rfl:   •  tiotropium (SPIRIVA) 18 MCG per inhalation capsule, Place 1 capsule into inhaler and inhale Daily., Disp: , Rfl:   •  aspirin 81 MG EC tablet, Take 1 tablet by mouth Daily., Disp: , Rfl:   •  nitrofurantoin, macrocrystal-monohydrate, (MACROBID) 100 MG capsule, TK 1 C PO BID FOR 7 DAYS, Disp: , Rfl: 0         ROS  Review of Systems   Cardiovascular: Negative for chest pain, dyspnea on exertion, irregular heartbeat and palpitations.   Respiratory: Positive for cough, shortness of breath and sputum production.    Musculoskeletal: Positive for muscle weakness.   Gastrointestinal: Positive for abdominal pain.   Psychiatric/Behavioral: The patient is nervous/anxious.        All other body systems reviewed and are negative    SOCIAL HX  Social History     Social History   • Marital status: Single     Spouse name: N/A   • Number of children: N/A   • Years of education: N/A     Occupational History   • Not on file.     Social History Main Topics   • Smoking status: Former Smoker     Packs/day: 1.00     Years: 50.00     Types: Cigarettes     Quit date: 06/2012   • Smokeless tobacco: Never Used   • Alcohol use No      Comment: PT REPORTS NOT DRINKING SINCE 2012   • Drug use: No   • Sexual activity: Defer     Other Topics Concern   •  Not on file     Social History Narrative       FAMILY HX  Family History   Problem Relation Age of Onset   • Heart disease Mother              Moe Maradiaga III, MD, FACC

## 2018-02-08 PROCEDURE — 25010000002 SULFUR HEXAFLUORIDE MICROSPH 60.7-25 MG RECONSTITUTED SUSPENSION: Performed by: INTERNAL MEDICINE

## 2018-02-08 RX ADMIN — SULFUR HEXAFLUORIDE 3 ML: KIT at 10:45

## 2018-03-19 ENCOUNTER — OFFICE VISIT (OUTPATIENT)
Dept: CARDIOLOGY | Facility: CLINIC | Age: 83
End: 2018-03-19

## 2018-03-19 VITALS
OXYGEN SATURATION: 95 % | WEIGHT: 167.6 LBS | SYSTOLIC BLOOD PRESSURE: 116 MMHG | BODY MASS INDEX: 29.7 KG/M2 | HEART RATE: 68 BPM | HEIGHT: 63 IN | DIASTOLIC BLOOD PRESSURE: 70 MMHG

## 2018-03-19 DIAGNOSIS — I71.40 ABDOMINAL AORTIC ANEURYSM (AAA) WITHOUT RUPTURE (HCC): ICD-10-CM

## 2018-03-19 DIAGNOSIS — I73.9 PVD (PERIPHERAL VASCULAR DISEASE) (HCC): ICD-10-CM

## 2018-03-19 DIAGNOSIS — E78.00 HYPERCHOLESTEROLEMIA: Primary | ICD-10-CM

## 2018-03-19 DIAGNOSIS — R00.0 TACHYCARDIA: ICD-10-CM

## 2018-03-19 DIAGNOSIS — I10 ESSENTIAL HYPERTENSION: ICD-10-CM

## 2018-03-19 PROCEDURE — 99213 OFFICE O/P EST LOW 20 MIN: CPT | Performed by: INTERNAL MEDICINE

## 2018-03-19 RX ORDER — METOPROLOL SUCCINATE 25 MG/1
25 TABLET, EXTENDED RELEASE ORAL DAILY
Qty: 90 TABLET | Refills: 4 | Status: SHIPPED | OUTPATIENT
Start: 2018-03-19

## 2018-03-19 NOTE — PROGRESS NOTES
[facility] at [facility]  Office visit  Nancy Trejo  1934  [phone]    VISIT DATE:  03/19/2018    PCP: ALEKSANDRA Anders MD  [address] [address]    CC:  Chief Complaint   Patient presents with   • Rapid Heart Rate   • Hypertension       Previous cardiac studies and procedures:  Holter January 2018: Premature atrial contractions  Echo February 2018: EF 50-55%, mild aortic insufficiency, PFO  Event monitor February 2018: Occasional premature atrial contractions and nonspecific nonsustained atrial tachyarrhythmias typically lasting less than 5 seconds in duration  In her 2018: CTA abdomen - Unruptured 3.1 cm x 3.3 cm aneurysm of mid abdominal aorta at renal artery level with mural thrombus.  Unruptured 3.5 cm aneurysm of the lower abdominal aorta along with mural thrombus, terminating at bifurcation.  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  High-grade stenosis with some residual trickle blood flow along left common iliac artery.    ASSESSMENT:   Diagnosis Plan   1. Hypercholesterolemia     2. Essential hypertension     3. Tachycardia     4. Abdominal aortic aneurysm (AAA) without rupture     5. PVD (peripheral vascular disease)         PLAN:  Premature atrial contractions and brief episodes of nonsustained atrial tachycardia: Continue low-dose beta-blockade.  Symptoms currently well-controlled.  No evidence of atrial fibrillation or atrial flutter on prolonged ambulatory ECG monitoring.    Peripheral vascular disease: Small abdominal aortic aneurysm with associated mural thrombus, right common iliac artery aneurysm, high-grade stenosis of left common iliac artery.  Currently stable and asymptomatic.  Continue aspirin, statin and afterload reduction.  Annual ultrasound imaging.    Hypertension: Goal less than 130/80 mmHg.  Continue current medical therapy.    Hyperlipidemia: On statin    Subjective  Denies chest pain,  "palpitations or dyspnea.  Tolerating low-dose beta-blockade without side effects.  Reviewed results of recent event monitoring.  Functional limitations mainly due to osteoarthritis.  Blood pressures running less than 130/80 mmHg.    PHYSICAL EXAMINATION:  Vitals:    03/19/18 1312   BP: 116/70   BP Location: Left arm   Patient Position: Sitting   Pulse: 68   SpO2: 95%   Weight: 76 kg (167 lb 9.6 oz)   Height: 160 cm (63\")     General Appearance:    Alert, cooperative, no distress, appears stated age   Head:    Normocephalic, without obvious abnormality, atraumatic   Eyes:    conjunctiva/corneas clear   Nose:   Nares normal, septum midline, mucosa normal, no drainage   Throat:   Lips, teeth and gums normal   Neck:   Supple, symmetrical, trachea midline, no carotid    bruit or JVD   Lungs:     Clear to auscultation bilaterally, respirations unlabored   Chest Wall:    No tenderness or deformity    Heart:    Regular rate and rhythm, S1 and S2 normal, no murmur, rub   or gallop, normal carotid impulse bilaterally without bruit.   Abdomen:     Soft, non-tender   Extremities:   Extremities normal, atraumatic, no cyanosis or edema   Pulses:   2+ and symmetric all extremities   Skin:   Skin color, texture, turgor normal, no rashes or lesions       Diagnostic Data:  Procedures  No results found for: CHLPL, TRIG, HDL, LDLDIRECT  Lab Results   Component Value Date    GLUCOSE 108 (H) 01/31/2018    BUN 8 (L) 01/31/2018    CREATININE 0.70 01/31/2018     01/31/2018    K 3.9 01/31/2018     01/31/2018    CO2 24.0 01/31/2018     No results found for: HGBA1C  Lab Results   Component Value Date    WBC 7.83 01/31/2018    HGB 14.2 01/31/2018    HCT 43.0 01/31/2018     01/31/2018       Allergies  No Known Allergies    Current Medications    Current Outpatient Prescriptions:   •  ADVAIR DISKUS 100-50 MCG/DOSE DISKUS, INL 1 PUFF PO BID, Disp: , Rfl: 5  •  aspirin 81 MG EC tablet, Take 1 tablet by mouth Daily., Disp: , Rfl: "   •  citalopram (CeleXA) 10 MG tablet, TK 1 T PO QD, Disp: , Rfl: 2  •  gabapentin (NEURONTIN) 300 MG capsule, Take 1 capsule by mouth 2 (Two) Times a Day., Disp: 30 capsule, Rfl: 0  •  guaiFENesin (MUCINEX) 600 MG 12 hr tablet, Take  by mouth 2 (two) times a day., Disp: , Rfl:   •  hydrochlorothiazide (HYDRODIURIL) 25 MG tablet, Take  by mouth Daily., Disp: , Rfl:   •  metFORMIN XR (GLUCOPHAGE-XR) 500 MG 24 hr tablet, daily., Disp: , Rfl: 1  •  metoprolol succinate XL (TOPROL-XL) 25 MG 24 hr tablet, Take 1 tablet by mouth Daily., Disp: 90 tablet, Rfl: 4  •  pravastatin (PRAVACHOL) 40 MG tablet, Take  by mouth daily., Disp: , Rfl:   •  tiotropium (SPIRIVA) 18 MCG per inhalation capsule, Place 1 capsule into inhaler and inhale Daily., Disp: , Rfl:           ROS  Review of Systems   Cardiovascular: Negative for chest pain, dyspnea on exertion, irregular heartbeat, leg swelling, palpitations and syncope.   Respiratory: Negative for cough, shortness of breath and snoring.        SOCIAL HX  Social History     Social History   • Marital status: Single     Spouse name: N/A   • Number of children: N/A   • Years of education: N/A     Occupational History   • Not on file.     Social History Main Topics   • Smoking status: Former Smoker     Packs/day: 1.00     Years: 50.00     Types: Cigarettes     Quit date: 2009   • Smokeless tobacco: Never Used   • Alcohol use No      Comment: PT REPORTS NOT DRINKING SINCE 2009   • Drug use: No   • Sexual activity: Defer     Other Topics Concern   • Not on file     Social History Narrative   • No narrative on file       FAMILY HX  Family History   Problem Relation Age of Onset   • Heart disease Mother              Moe Maradiaga III, MD, [facility]

## 2019-02-01 DIAGNOSIS — I71.40 AAA (ABDOMINAL AORTIC ANEURYSM) WITHOUT RUPTURE (HCC): Primary | ICD-10-CM

## 2019-03-25 ENCOUNTER — APPOINTMENT (OUTPATIENT)
Dept: ULTRASOUND IMAGING | Facility: HOSPITAL | Age: 84
End: 2019-03-25
Attending: INTERNAL MEDICINE

## 2019-11-18 ENCOUNTER — APPOINTMENT (OUTPATIENT)
Dept: ULTRASOUND IMAGING | Facility: HOSPITAL | Age: 84
End: 2019-11-18

## 2021-08-05 ENCOUNTER — TRANSCRIBE ORDERS (OUTPATIENT)
Dept: ADMINISTRATIVE | Facility: HOSPITAL | Age: 86
End: 2021-08-05

## 2021-08-05 DIAGNOSIS — I71.40 ABDOMINAL AORTIC ANEURYSM WITHOUT RUPTURE (HCC): Primary | ICD-10-CM

## 2021-08-19 ENCOUNTER — HOSPITAL ENCOUNTER (OUTPATIENT)
Dept: ULTRASOUND IMAGING | Facility: HOSPITAL | Age: 86
Discharge: HOME | End: 2021-08-19

## 2021-08-25 ENCOUNTER — HOSPITAL ENCOUNTER (OUTPATIENT)
Dept: ULTRASOUND IMAGING | Facility: HOSPITAL | Age: 86
Discharge: HOME | End: 2021-08-25
Admitting: FAMILY MEDICINE

## 2021-08-25 DIAGNOSIS — I71.40 ABDOMINAL AORTIC ANEURYSM WITHOUT RUPTURE (HCC): ICD-10-CM

## 2021-08-25 PROCEDURE — 76775 US EXAM ABDO BACK WALL LIM: CPT

## 2021-09-27 ENCOUNTER — CLINICAL SUPPORT NO REQUIREMENTS (OUTPATIENT)
Dept: PULMONOLOGY | Facility: CLINIC | Age: 86
End: 2021-09-27

## 2021-09-27 DIAGNOSIS — Z01.812 BLOOD TESTS PRIOR TO TREATMENT OR PROCEDURE: ICD-10-CM

## 2021-09-27 DIAGNOSIS — Z01.812 BLOOD TESTS PRIOR TO TREATMENT OR PROCEDURE: Primary | ICD-10-CM

## 2021-09-27 PROCEDURE — 99211 OFF/OP EST MAY X REQ PHY/QHP: CPT | Performed by: INTERNAL MEDICINE

## 2021-09-27 PROCEDURE — U0004 COV-19 TEST NON-CDC HGH THRU: HCPCS | Performed by: INTERNAL MEDICINE

## 2021-09-28 LAB — SARS-COV-2 RNA NOSE QL NAA+PROBE: NOT DETECTED

## 2021-09-29 ENCOUNTER — OFFICE VISIT (OUTPATIENT)
Dept: PULMONOLOGY | Facility: CLINIC | Age: 86
End: 2021-09-29

## 2021-09-29 VITALS
DIASTOLIC BLOOD PRESSURE: 76 MMHG | WEIGHT: 170 LBS | TEMPERATURE: 97.6 F | OXYGEN SATURATION: 95 % | BODY MASS INDEX: 33.38 KG/M2 | HEART RATE: 88 BPM | HEIGHT: 60 IN | SYSTOLIC BLOOD PRESSURE: 130 MMHG

## 2021-09-29 DIAGNOSIS — J44.9 COPD, SEVERE (HCC): ICD-10-CM

## 2021-09-29 DIAGNOSIS — J96.11 CHRONIC HYPOXEMIC RESPIRATORY FAILURE (HCC): ICD-10-CM

## 2021-09-29 DIAGNOSIS — J44.9 CHRONIC OBSTRUCTIVE PULMONARY DISEASE, UNSPECIFIED COPD TYPE (HCC): Primary | ICD-10-CM

## 2021-09-29 DIAGNOSIS — J44.9 COPD, SEVERE (HCC): Primary | ICD-10-CM

## 2021-09-29 PROCEDURE — 94729 DIFFUSING CAPACITY: CPT | Performed by: INTERNAL MEDICINE

## 2021-09-29 PROCEDURE — 99204 OFFICE O/P NEW MOD 45 MIN: CPT | Performed by: INTERNAL MEDICINE

## 2021-09-29 PROCEDURE — 94726 PLETHYSMOGRAPHY LUNG VOLUMES: CPT | Performed by: INTERNAL MEDICINE

## 2021-09-29 PROCEDURE — 94375 RESPIRATORY FLOW VOLUME LOOP: CPT | Performed by: INTERNAL MEDICINE

## 2021-09-29 NOTE — PROGRESS NOTES
New Pulmonary Patient Office Visit      Patient Name: Nancy Trejo    Referring Physician: ADAMS Anders MD    Chief Complaint:  Severe O2 dependent COPD      History of Present Illness: Nancy Trejo is a 87 y.o. female who is here today to establish care with Pulmonary.     87-year-old female with past medical history of extensive smoking, severe COPD which is oxygen dependent presenting here for evaluation.  Patient was seen here more than 5 years ago.  Now she is sent back for oxygen recertification and for assessment of medications.  Patient tells me that she has chronic cough which has been there for a number of years.  She does bring up thick sputum with that.  Denies any fevers or chills.  Denies any hemoptysis.  Denies any change in appetite or any weight loss.  States that she sleeps okay.  She does get short of breath with activity which has been a chronic problem for her as well.  States that she is generally not walking too much.  She only goes up and down the stairs at times but gets very short of breath.  She uses oxygen as needed with exertional activity and during sleep.  We will continue the same for now given her underlying severe COPD.      Patient denies any current smoking.  No secondhand smoke exposure.  She states that she is sleeping okay at times.  Sometimes she has hard time going to sleep.  Denies any orthopnea or PND symptoms however.    Patient states that she is compliant with her medications of inhalers.  She is using Wixela inhaler as well as Spiriva.  Doses are not clear to patient and we will try to call the pharmacy and get that information and renew those prescriptions for her.  She denies any overt side effects from medication.  No thrush noted.  Rinsing mouth after using the inhaler.    Patient states that she has taken both COVID-19 vaccinations and awaiting booster now.  She does take influenza vaccination as well but is not interested in pneumonia  vaccines.      Subjective      Review of Systems:   Review of Systems   Constitutional: Positive for fatigue.   HENT: Positive for dental problem.    Eyes: Positive for itching.   Respiratory: Positive for cough and shortness of breath.    Cardiovascular: Negative.    Gastrointestinal: Negative.    Endocrine: Negative.    Musculoskeletal: Negative.    Skin: Negative.    Neurological: Positive for numbness.   Hematological: Negative.    Psychiatric/Behavioral: The patient is nervous/anxious.    All other systems reviewed and are negative.      Past Medical History:   Past Medical History:   Diagnosis Date   • Diabetes mellitus (CMS/HCC)    • H/O chest x-ray 07/22/2014    no active disease   • H/O chest x-ray 02/24/2014    Trachea is midline. Mild interstitial disease versus pulmonary congestion in bilateral bases, right greater than left which does not appear to be greatly changes from prev exam 08/01/12.The remaider of lungs are grossly clear. There is a left subclavian Port- A cath noted. Diaphragms are flattened consistent w/ COPD. degenrative changes of thoracolumbar spine   • H/O chest x-ray 02/18/2013    Lung hyperinflated w/ flattened diaphragms consistent w/ COPD. Cardiac silhoutte at upper limits of normal. Port A Cath overlying left chest that appears to be entering left subclavian vein & ends at cavoatrial junction. Scattered Pulmonary nodules, primarily right hilar region, consistent w/ prio granulomatous infection. Lungs grossly clear of dominant nodules, infiltrates or massesNSCfrom6/2012   • History of PFTs 02/24/2014    Moderate to severe obstruction with BD response. positive air trapping, DLCO decreased   • History of PFTs 02/18/2013    Moderate obstruction, no ABDR   • History of PFTs 06/28/2012    PFT acceptable and reproducible. Pt given 3 puffs xopenex. Pt had difficult time panting during plethysmography, attempted 2 times,pt  unable to complete. DLCO best effort. Best of pt ability       Past  Surgical History:   Past Surgical History:   Procedure Laterality Date   •  SECTION     • HYSTERECTOMY      Removal of both ovaries-secondary to uterine  cancer       Family History:   Family History   Problem Relation Age of Onset   • Heart disease Mother    • Heart disease Father    • Hypertension Father        Social History:   Social History     Socioeconomic History   • Marital status: Single     Spouse name: Not on file   • Number of children: Not on file   • Years of education: Not on file   • Highest education level: Not on file   Tobacco Use   • Smoking status: Former Smoker     Packs/day: 1.00     Years: 50.00     Pack years: 50.00     Types: Cigarettes     Quit date:      Years since quittin.7   • Smokeless tobacco: Never Used   Substance and Sexual Activity   • Alcohol use: No     Comment: PT REPORTS NOT DRINKING SINCE    • Drug use: No   • Sexual activity: Defer       Medications:     Current Outpatient Medications:   •  ADVAIR DISKUS 100-50 MCG/DOSE DISKUS, INL 1 PUFF PO BID, Disp: , Rfl: 5  •  aspirin 81 MG EC tablet, Take 1 tablet by mouth Daily., Disp: , Rfl:   •  gabapentin (NEURONTIN) 300 MG capsule, Take 1 capsule by mouth 2 (Two) Times a Day., Disp: 30 capsule, Rfl: 0  •  metoprolol succinate XL (TOPROL-XL) 25 MG 24 hr tablet, Take 1 tablet by mouth Daily. (Patient taking differently: Take 50 mg by mouth Daily.), Disp: 90 tablet, Rfl: 4  •  pravastatin (PRAVACHOL) 40 MG tablet, Take  by mouth daily., Disp: , Rfl:   •  tiotropium (SPIRIVA) 18 MCG per inhalation capsule, Place 1 capsule into inhaler and inhale Daily., Disp: , Rfl:     Allergies:   Allergies   Allergen Reactions   • Cipro [Ciprofloxacin Hcl] Rash       Objective     Physical Exam:  Vital Signs: There were no vitals filed for this visit.    Physical Exam  Vitals and nursing note reviewed.   Constitutional:       General: She is not in acute distress.     Appearance: She is well-developed. She is not diaphoretic.    HENT:      Head: Normocephalic and atraumatic.      Comments: Mallampati 3 airway, large tongue     Nose: Nose normal.      Mouth/Throat:      Pharynx: No oropharyngeal exudate.   Eyes:      General: No scleral icterus.        Right eye: No discharge.         Left eye: No discharge.      Pupils: Pupils are equal, round, and reactive to light.   Neck:      Thyroid: No thyromegaly.      Trachea: No tracheal deviation.   Cardiovascular:      Rate and Rhythm: Normal rate and regular rhythm.      Heart sounds: Normal heart sounds. No murmur heard.   No friction rub.   Pulmonary:      Effort: Pulmonary effort is normal. No respiratory distress.      Breath sounds: No stridor. No wheezing.      Comments: Poor air entry bilaterally.   Abdominal:      General: There is no distension.      Palpations: Abdomen is soft.   Musculoskeletal:         General: No tenderness.      Cervical back: Neck supple.      Right lower leg: No edema.      Left lower leg: No edema.      Comments: Clubbing: none   Lymphadenopathy:      Cervical: No cervical adenopathy.   Neurological:      Mental Status: She is alert and oriented to person, place, and time.      Cranial Nerves: No cranial nerve deficit.      Coordination: Coordination normal.   Psychiatric:         Behavior: Behavior normal.         Thought Content: Thought content normal.         Judgment: Judgment normal.         Results Review:   I reviewed the patient's new clinical results.  Chest x-ray done today reviewed personally and showed no significant acute pathology.  Chronic COPD with prominent pulmonary artery noted.    PFT IMPRESSION:   1. Available data suggests severe obstruction. FEV1 0.47L, 47% predicted.     2. Lung volume reveals air trapping.        3. Airway resistance is elevated.  4. DLCO is severely reduced at 31% predicted and could be suggestive of emphysema, interstitial lung disease, significant anemia, Pulmonary vascular disease etc. Clinical correlation will be  required.      Assessment / Plan      Assessment:   Problem List Items Addressed This Visit        Pulmonary and Pneumonias    COPD (chronic obstructive pulmonary disease) (CMS/HCC) - Primary    Overview     .  Severe obstruction her pulmonary function tests, former smoker.B.  Hypoxemia, on      home oxygen when necessary.         Relevant Medications    tiotropium (SPIRIVA) 18 MCG per inhalation capsule    ADVAIR DISKUS 100-50 MCG/DOSE DISKUS      Other Visit Diagnoses     Chronic hypoxemic respiratory failure (HCC)        COPD, severe (CMS/HCC)        Relevant Orders    XR Chest PA & Lateral (Completed)    Pulmonary Function Test (Completed)          Plan:   1.  Patient with severe COPD with oxygen dependent.  Currently doing okay without any recent flareups.  Chest x-ray does not show any other acute pathology.  Patient does have oxygen at home which she uses as needed and during sleep.  Advised to continue the same.  Prescription renewed.  She is not doing much exercise or activity and pretty limited and mostly sitting in wheelchair clinic visit today.  Given her frailty we will monitor her for now.  Continue current state inhalers as well as bronchodilators.  No recent flareups.  No need to change any therapy at this point.    2.  Chest x-ray reviewed and does not show any acute pathology.  Chest x-ray appear pretty stable.  PFTs are appearing pretty stable as well.  We will continue current monitoring and treatment plan.    3.  If starts having frequent flareup may be candidate for NIV.    We will follow in clinic in 6 months or sooner as needed.  Prescriptions renewed and oxygen recertification done.    Follow Up:   6 months    Discussed plan of care in detail with patient today. Patient verbally understands and agrees. I spent 50 minutes on this date of service. This time includes time spent by me in the following activities:preparing for the visit, reviewing tests and imaging, reviewing old records,  obtaining and/or reviewing a separately obtained history, performing a medically appropriate examination, counseling the patient, ordering medications, tests, or procedures, and/or documenting information in the medical record.     North Hernandez MD  Pulmonary Critical Care and Sleep Medicine      Please note that portions of this note may have been completed with a voice recognition program. Efforts were made to edit the dictations, but occasionally words are mistranscribed.

## 2021-12-23 ENCOUNTER — HOSPITAL ENCOUNTER (EMERGENCY)
Facility: HOSPITAL | Age: 86
Discharge: HOME | End: 2021-12-23
Attending: EMERGENCY MEDICINE | Admitting: EMERGENCY MEDICINE

## 2021-12-23 ENCOUNTER — APPOINTMENT (OUTPATIENT)
Dept: CT IMAGING | Facility: HOSPITAL | Age: 86
End: 2021-12-23

## 2021-12-23 VITALS
HEART RATE: 78 BPM | WEIGHT: 145 LBS | RESPIRATION RATE: 18 BRPM | SYSTOLIC BLOOD PRESSURE: 178 MMHG | HEIGHT: 63 IN | DIASTOLIC BLOOD PRESSURE: 89 MMHG | OXYGEN SATURATION: 96 % | TEMPERATURE: 98.3 F | BODY MASS INDEX: 25.69 KG/M2

## 2021-12-23 DIAGNOSIS — S09.90XA TRAUMATIC INJURY OF HEAD, INITIAL ENCOUNTER: ICD-10-CM

## 2021-12-23 DIAGNOSIS — S16.1XXA CERVICAL STRAIN, ACUTE, INITIAL ENCOUNTER: ICD-10-CM

## 2021-12-23 DIAGNOSIS — I71.9 AORTIC ANEURYSM WITHOUT RUPTURE, UNSPECIFIED PORTION OF AORTA (HCC): ICD-10-CM
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
## 2021-12-23 PROCEDURE — 99282 EMERGENCY DEPT VISIT SF MDM: CPT

## 2021-12-23 PROCEDURE — 72125 CT NECK SPINE W/O DYE: CPT

## 2021-12-23 PROCEDURE — 70450 CT HEAD/BRAIN W/O DYE: CPT

## 2021-12-23 NOTE — ED PROVIDER NOTES
Subjective   87-year-old female comes emerged from today after having a slip and fall in her kitchen.  She slipped and fell on some water.  Hit the back of her head.  She did not lose consciousness.  She is not on an anticoagulant.  She has no neck pain.  States that she has a lump on the back of her head on the left side mostly.  She has no chest pain associated this no shortness of breath no hip knee shoulder or elbow pain.  She is here because she is a bit of a headache and hit her head she like to get a CAT scan.      History provided by:  Patient   used: No    Head Injury  Location:  Occipital  Mechanism of injury: fall    Fall:     Fall occurred:  Standing    Impact surface:  Hard floor    Point of impact:  Head    Entrapped after fall: no    Pain details:     Quality:  Dull    Severity:  Mild    Timing:  Constant    Progression:  Unchanged  Chronicity:  New  Relieved by:  Nothing  Worsened by:  Nothing  Ineffective treatments:  None tried  Associated symptoms: no difficulty breathing, no headaches, no hearing loss, no nausea, no neck pain, no seizures and no vomiting    Fall  Mechanism of injury: fall    Injury location:  Head/neck  Head/neck injury location:  Scalp  Incident location:  Home  Time since incident:  1 hour  Arrived directly from scene: yes    Associated symptoms: no abdominal pain, no back pain, no blindness, no chest pain, no difficulty breathing, no headaches, no hearing loss, no nausea, no neck pain, no seizures and no vomiting    Risk factors: no AICD, no beta blocker therapy, no CABG, no CAD, no COPD, no dialysis, no hemophilia and no pacemaker        Review of Systems   Constitutional: Negative for chills and fever.   HENT: Negative for hearing loss.    Eyes: Negative for blindness.   Respiratory: Negative for chest tightness, shortness of breath and wheezing.    Cardiovascular: Negative for chest pain.   Gastrointestinal: Negative for abdominal pain, nausea and  vomiting.   Genitourinary: Negative for dysuria, frequency and urgency.   Musculoskeletal: Negative for back pain and neck pain.   Skin: Negative for rash.   Neurological: Negative for seizures and headaches.   Hematological: Negative for adenopathy.   Psychiatric/Behavioral: Negative.    All other systems reviewed and are negative.      Past Medical History:   Diagnosis Date   • Diabetes mellitus (HCC)    • H/O chest x-ray 2014    no active disease   • H/O chest x-ray 2014    Trachea is midline. Mild interstitial disease versus pulmonary congestion in bilateral bases, right greater than left which does not appear to be greatly changes from prev exam 12.The remaider of lungs are grossly clear. There is a left subclavian Port- A cath noted. Diaphragms are flattened consistent w/ COPD. degenrative changes of thoracolumbar spine   • H/O chest x-ray 2013    Lung hyperinflated w/ flattened diaphragms consistent w/ COPD. Cardiac silhoutte at upper limits of normal. Port A Cath overlying left chest that appears to be entering left subclavian vein & ends at cavoatrial junction. Scattered Pulmonary nodules, primarily right hilar region, consistent w/ prio granulomatous infection. Lungs grossly clear of dominant nodules, infiltrates or [id number]   • History of PFTs 2014    Moderate to severe obstruction with BD response. positive air trapping, DLCO decreased   • History of PFTs 2013    Moderate obstruction, no ABDR   • History of PFTs 2012    PFT acceptable and reproducible. Pt given 3 puffs xopenex. Pt had difficult time panting during plethysmography, attempted 2 times,pt  unable to complete. DLCO best effort. Best of pt ability       Allergies   Allergen Reactions   • Cipro [Ciprofloxacin Hcl] Rash       Past Surgical History:   Procedure Laterality Date   •  SECTION     • HYSTERECTOMY      Removal of both ovaries-secondary to uterine  cancer       Family History    Problem Relation Age of Onset   • Heart disease Mother    • Heart disease Father    • Hypertension Father        Social History     Socioeconomic History   • Marital status: Single   Tobacco Use   • Smoking status: Former Smoker     Packs/day: 1.00     Years: 50.00     Pack years: 50.00     Types: Cigarettes     Quit date:      Years since quittin.9   • Smokeless tobacco: Never Used   Substance and Sexual Activity   • Alcohol use: No     Comment: PT REPORTS NOT DRINKING SINCE    • Drug use: No   • Sexual activity: Defer           Objective   Physical Exam  Vitals and nursing note reviewed.   Constitutional:       Appearance: She is well-developed.   HENT:      Head: Normocephalic.      Comments: Hematoma noted on the left posterior aspect of the scalp.  There is no open wounds.  No crepitus.     Right Ear: External ear normal.      Left Ear: External ear normal.      Nose: Nose normal.   Eyes:      General: No scleral icterus.     Conjunctiva/sclera: Conjunctivae normal.      Pupils: Pupils are equal, round, and reactive to light.   Neck:      Thyroid: No thyromegaly.      Comments: Full range of motion no step-off no tenderness to palpation.  Cardiovascular:      Rate and Rhythm: Normal rate and regular rhythm.      Heart sounds: Normal heart sounds.   Pulmonary:      Effort: Pulmonary effort is normal. No respiratory distress.      Breath sounds: Normal breath sounds. No wheezing or rales.   Chest:      Chest wall: No tenderness.   Abdominal:      General: Bowel sounds are normal. There is no distension.      Palpations: Abdomen is soft.      Tenderness: There is no abdominal tenderness.   Musculoskeletal:         General: Normal range of motion.      Cervical back: Normal range of motion.   Lymphadenopathy:      Cervical: No cervical adenopathy.   Skin:     General: Skin is warm and dry.   Neurological:      Mental Status: She is alert and oriented to person, place, and time.      Cranial Nerves:  No cranial nerve deficit.      Coordination: Coordination normal.      Deep Tendon Reflexes: Reflexes are normal and symmetric. Reflexes normal.   Psychiatric:         Behavior: Behavior normal.         Thought Content: Thought content normal.         Judgment: Judgment normal.         Procedures           ED Course  ED Course as of 12/23/21 2122   Thu Dec 23, 2021   1850 Aortic aneurysm is noted.  As discussed with the patient she is already aware this is been followed by cardiothoracic surgeon.  She had a work-up on it as recently as last month. [YESSY]      ED Course User Index  [YESSY] Moe Teran PA                                         No results found for this or any previous visit (from the past 24 hour(s)).  Note: In addition to lab results from this visit, the labs listed above may include labs taken at another facility or during a different encounter within the last 24 hours. Please correlate lab times with ED admission and discharge times for further clarification of the services performed during this visit.    CT Head Without Contrast   Preliminary Result   Posterior left parietal scalp hematoma. No evidence of acute   trauma to the brain or other acute intracranial disease.       DICTATED:   12/23/2021   EDITED/lfs:   12/23/2021          CT Cervical Spine Without Contrast   Preliminary Result   1. Multilevel cervical spine degenerative disc disease but no evidence   of acute cervical spine trauma, no evidence of significant focal   subluxation or gross evidence of spinal stenosis.       2. Aortic arch is only partially included on this exam, but shows what   appears to be focal, saccular aneurysmal dilatation. There are no   previous chest imaging studies here. At some point, consider follow-up   chest CT scan for further characterization, if this is not known from   outside imaging studies.       DICTATED:   12/23/2021   EDITED/lfs:   12/23/2021            Vitals:    12/23/21 1501 12/23/21 1538  "  BP: 178/89    BP Location: Right arm    Patient Position: Sitting    Pulse: 84 78   Resp: 18    Temp: 98.3 °F (36.8 °C)    TempSrc: Oral    SpO2: 92% 96%   Weight: 65.8 kg (145 lb)    Height: 160 cm (63\")      Medications - No data to display  ECG/EMG Results (last 24 hours)     ** No results found for the last 24 hours. **        No orders to display               MDM  Number of Diagnoses or Management Options  Aortic aneurysm without rupture, unspecified portion of aorta (HCC): established and improving  Cervical strain, acute, initial encounter: new and requires workup  Fall, initial encounter: new and requires workup  Traumatic injury of head, initial encounter: new and requires workup     Amount and/or Complexity of Data Reviewed  Tests in the radiology section of CPT®: reviewed and ordered  Discuss the patient with other providers: yes    Patient Progress  Patient progress: stable      Final diagnoses:   Fall, initial encounter   Traumatic injury of head, initial encounter   Cervical strain, acute, initial encounter   Aortic aneurysm without rupture, unspecified portion of aorta (HCC)       ED Disposition  ED Disposition     ED Disposition Condition Comment    Discharge Stable           ADAMS Anders MD  [address] [address]  [phone]               Medication List      Changed    metoprolol succinate XL 25 MG 24 hr tablet  Commonly known as: TOPROL-XL  Take 1 tablet by mouth Daily.  What changed: how much to take             Moe Teran PA  12/23/21 2122    "

## 2024-05-20 ENCOUNTER — APPOINTMENT (OUTPATIENT)
Dept: CT IMAGING | Facility: HOSPITAL | Age: 89
End: 2024-05-20
Payer: MEDICARE

## 2024-05-20 ENCOUNTER — APPOINTMENT (OUTPATIENT)
Dept: GENERAL RADIOLOGY | Facility: HOSPITAL | Age: 89
End: 2024-05-20
Payer: MEDICARE

## 2024-05-20 ENCOUNTER — HOSPITAL ENCOUNTER (EMERGENCY)
Facility: HOSPITAL | Age: 89
Discharge: HOME | End: 2024-05-20
Attending: EMERGENCY MEDICINE | Admitting: EMERGENCY MEDICINE
Payer: MEDICARE

## 2024-05-20 VITALS
BODY MASS INDEX: 27.11 KG/M2 | HEART RATE: 69 BPM | HEIGHT: 63 IN | DIASTOLIC BLOOD PRESSURE: 94 MMHG | RESPIRATION RATE: 20 BRPM | WEIGHT: 153 LBS | SYSTOLIC BLOOD PRESSURE: 153 MMHG | TEMPERATURE: 98.4 F | OXYGEN SATURATION: 95 %

## 2024-05-20 DIAGNOSIS — J43.9 PULMONARY EMPHYSEMA, UNSPECIFIED EMPHYSEMA TYPE: Primary | ICD-10-CM

## 2024-05-20 DIAGNOSIS — N81.10 BLADDER PROLAPSE, FEMALE, ACQUIRED: ICD-10-CM

## 2024-05-20 DIAGNOSIS — I71.22 ANEURYSM OF AORTIC ARCH WITHOUT RUPTURE: ICD-10-CM

## 2024-05-20 LAB
ALBUMIN SERPL-MCNC: 3.4 G/DL (ref 3.5–5.2)
ALBUMIN/GLOB SERPL: 0.8 G/DL
ALP SERPL-CCNC: 122 U/L (ref 39–117)
ALT SERPL W P-5'-P-CCNC: 8 U/L (ref 1–33)
ANION GAP SERPL CALCULATED.3IONS-SCNC: 10 MMOL/L (ref 5–15)
AST SERPL-CCNC: 13 U/L (ref 1–32)
B PARAPERT DNA SPEC QL NAA+PROBE: NOT DETECTED
B PERT DNA SPEC QL NAA+PROBE: NOT DETECTED
BACTERIA UR QL AUTO: ABNORMAL /HPF
BASOPHILS # BLD AUTO: 0.06 10*3/MM3 (ref 0–0.2)
BASOPHILS NFR BLD AUTO: 1.1 % (ref 0–1.5)
BILIRUB SERPL-MCNC: 0.6 MG/DL (ref 0–1.2)
BILIRUB UR QL STRIP: NEGATIVE
BUN SERPL-MCNC: 11 MG/DL (ref 8–23)
BUN/CREAT SERPL: 16.4 (ref 7–25)
C PNEUM DNA NPH QL NAA+NON-PROBE: NOT DETECTED
CALCIUM SPEC-SCNC: 9.6 MG/DL (ref 8.6–10.5)
CHLORIDE SERPL-SCNC: 97 MMOL/L (ref 98–107)
CLARITY UR: CLEAR
CO2 SERPL-SCNC: 31 MMOL/L (ref 22–29)
COLOR UR: YELLOW
CREAT SERPL-MCNC: 0.67 MG/DL (ref 0.57–1)
D-LACTATE SERPL-SCNC: 1.7 MMOL/L (ref 0.5–2)
DEPRECATED RDW RBC AUTO: 45.8 FL (ref 37–54)
EGFRCR SERPLBLD CKD-EPI 2021: 83.7 ML/MIN/1.73
EOSINOPHIL # BLD AUTO: 0.06 10*3/MM3 (ref 0–0.4)
EOSINOPHIL NFR BLD AUTO: 1.1 % (ref 0.3–6.2)
ERYTHROCYTE [DISTWIDTH] IN BLOOD BY AUTOMATED COUNT: 13.6 % (ref 12.3–15.4)
FLUAV SUBTYP SPEC NAA+PROBE: NOT DETECTED
FLUBV RNA ISLT QL NAA+PROBE: NOT DETECTED
GLOBULIN UR ELPH-MCNC: 4.5 GM/DL
GLUCOSE SERPL-MCNC: 104 MG/DL (ref 65–99)
GLUCOSE UR STRIP-MCNC: NEGATIVE MG/DL
HADV DNA SPEC NAA+PROBE: NOT DETECTED
HCOV 229E RNA SPEC QL NAA+PROBE: NOT DETECTED
HCOV HKU1 RNA SPEC QL NAA+PROBE: NOT DETECTED
HCOV NL63 RNA SPEC QL NAA+PROBE: NOT DETECTED
HCOV OC43 RNA SPEC QL NAA+PROBE: NOT DETECTED
HCT VFR BLD AUTO: 48.6 % (ref 34–46.6)
HGB BLD-MCNC: 15.1 G/DL (ref 12–15.9)
HGB UR QL STRIP.AUTO: ABNORMAL
HMPV RNA NPH QL NAA+NON-PROBE: NOT DETECTED
HOLD SPECIMEN: NORMAL
HPIV1 RNA ISLT QL NAA+PROBE: NOT DETECTED
HPIV2 RNA SPEC QL NAA+PROBE: NOT DETECTED
HPIV3 RNA NPH QL NAA+PROBE: NOT DETECTED
HPIV4 P GENE NPH QL NAA+PROBE: NOT DETECTED
HYALINE CASTS UR QL AUTO: ABNORMAL /LPF
IMM GRANULOCYTES # BLD AUTO: 0.01 10*3/MM3 (ref 0–0.05)
IMM GRANULOCYTES NFR BLD AUTO: 0.2 % (ref 0–0.5)
KETONES UR QL STRIP: NEGATIVE
LEUKOCYTE ESTERASE UR QL STRIP.AUTO: NEGATIVE
LYMPHOCYTES # BLD AUTO: 1.19 10*3/MM3 (ref 0.7–3.1)
LYMPHOCYTES NFR BLD AUTO: 21.3 % (ref 19.6–45.3)
M PNEUMO IGG SER IA-ACNC: NOT DETECTED
MCH RBC QN AUTO: 28.2 PG (ref 26.6–33)
MCHC RBC AUTO-ENTMCNC: 31.1 G/DL (ref 31.5–35.7)
MCV RBC AUTO: 90.7 FL (ref 79–97)
MONOCYTES # BLD AUTO: 0.41 10*3/MM3 (ref 0.1–0.9)
MONOCYTES NFR BLD AUTO: 7.3 % (ref 5–12)
NEUTROPHILS NFR BLD AUTO: 3.85 10*3/MM3 (ref 1.7–7)
NEUTROPHILS NFR BLD AUTO: 69 % (ref 42.7–76)
NITRITE UR QL STRIP: NEGATIVE
NRBC BLD AUTO-RTO: 0 /100 WBC (ref 0–0.2)
NT-PROBNP SERPL-MCNC: 456 PG/ML (ref 0–1800)
PH UR STRIP.AUTO: 6 [PH] (ref 5–8)
PLATELET # BLD AUTO: 195 10*3/MM3 (ref 140–450)
PMV BLD AUTO: 10 FL (ref 6–12)
POTASSIUM SERPL-SCNC: 4.1 MMOL/L (ref 3.5–5.2)
PROT SERPL-MCNC: 7.9 G/DL (ref 6–8.5)
PROT UR QL STRIP: NEGATIVE
QT INTERVAL: 368 MS
QTC INTERVAL: 419 MS
RBC # BLD AUTO: 5.36 10*6/MM3 (ref 3.77–5.28)
RBC # UR STRIP: ABNORMAL /HPF
REF LAB TEST METHOD: ABNORMAL
RHINOVIRUS RNA SPEC NAA+PROBE: NOT DETECTED
RSV RNA NPH QL NAA+NON-PROBE: NOT DETECTED
SARS-COV-2 RNA NPH QL NAA+NON-PROBE: NOT DETECTED
SODIUM SERPL-SCNC: 138 MMOL/L (ref 136–145)
SP GR UR STRIP: 1.03 (ref 1–1.03)
SQUAMOUS #/AREA URNS HPF: ABNORMAL /HPF
TROPONIN T SERPL HS-MCNC: 19 NG/L
UROBILINOGEN UR QL STRIP: ABNORMAL
WBC # UR STRIP: ABNORMAL /HPF
WBC NRBC COR # BLD AUTO: 5.58 10*3/MM3 (ref 3.4–10.8)
WHOLE BLOOD HOLD COAG: NORMAL
WHOLE BLOOD HOLD SPECIMEN: NORMAL

## 2024-05-20 PROCEDURE — 80053 COMPREHEN METABOLIC PANEL: CPT | Performed by: EMERGENCY MEDICINE

## 2024-05-20 PROCEDURE — 81001 URINALYSIS AUTO W/SCOPE: CPT

## 2024-05-20 PROCEDURE — 71045 X-RAY EXAM CHEST 1 VIEW: CPT

## 2024-05-20 PROCEDURE — 87040 BLOOD CULTURE FOR BACTERIA: CPT

## 2024-05-20 PROCEDURE — 84484 ASSAY OF TROPONIN QUANT: CPT | Performed by: EMERGENCY MEDICINE

## 2024-05-20 PROCEDURE — 36415 COLL VENOUS BLD VENIPUNCTURE: CPT

## 2024-05-20 PROCEDURE — 71275 CT ANGIOGRAPHY CHEST: CPT

## 2024-05-20 PROCEDURE — 70450 CT HEAD/BRAIN W/O DYE: CPT

## 2024-05-20 PROCEDURE — 25510000001 IOPAMIDOL PER 1 ML: Performed by: EMERGENCY MEDICINE

## 2024-05-20 PROCEDURE — 93005 ELECTROCARDIOGRAM TRACING: CPT | Performed by: EMERGENCY MEDICINE

## 2024-05-20 PROCEDURE — 99285 EMERGENCY DEPT VISIT HI MDM: CPT

## 2024-05-20 PROCEDURE — 0202U NFCT DS 22 TRGT SARS-COV-2: CPT

## 2024-05-20 PROCEDURE — 85025 COMPLETE CBC W/AUTO DIFF WBC: CPT | Performed by: EMERGENCY MEDICINE

## 2024-05-20 PROCEDURE — 83605 ASSAY OF LACTIC ACID: CPT

## 2024-05-20 PROCEDURE — 83880 ASSAY OF NATRIURETIC PEPTIDE: CPT | Performed by: EMERGENCY MEDICINE

## 2024-05-20 RX ORDER — SODIUM CHLORIDE 0.9 % (FLUSH) 0.9 %
10 SYRINGE (ML) INJECTION AS NEEDED
Status: DISCONTINUED | OUTPATIENT
Start: 2024-05-20 | End: 2024-05-20 | Stop reason: HOSPADM

## 2024-05-20 RX ADMIN — IOPAMIDOL 75 ML: 755 INJECTION, SOLUTION INTRAVENOUS at 13:08

## 2024-05-20 NOTE — DISCHARGE INSTRUCTIONS
Call the CT surgery office and schedule an outpatient appointment.  Call the urology office and also schedule an appointment.  Return to the ED for any new or concerning symptoms, including sudden notice of breath, tearing chest pain.

## 2024-05-20 NOTE — ED PROVIDER NOTES
Subjective   History of Present Illness patient is an 89-year-old female who presents via EMS with her daughter, with multiple complaints.  Patient reports she has been short of breath with exertion, the last 3 to 4 days, reporting that she has to sit down for approximately 30 minutes to catch her breath.  Reports she also feels lightheaded when she stands up initially.  She was prescribed oxygen at night only, but EMS started it and route, she is saturating approximately 92% on room air.  Patient also complains of increased lower extremity swelling in the same period of time.  In addition, patient complains of drainage for the past month, as well as urinary burning.    Review of Systems   Constitutional:  Positive for activity change.   HENT:  Positive for rhinorrhea.    Eyes:  Positive for discharge and redness.   Respiratory:  Positive for shortness of breath.    Cardiovascular:  Positive for leg swelling.   Gastrointestinal: Negative.    Genitourinary:  Positive for dysuria.   Musculoskeletal: Negative.    Skin: Negative.    Neurological: Negative.        Past Medical History:   Diagnosis Date    Diabetes mellitus     H/O chest x-ray 07/22/2014    no active disease    H/O chest x-ray 02/24/2014    Trachea is midline. Mild interstitial disease versus pulmonary congestion in bilateral bases, right greater than left which does not appear to be greatly changes from prev exam 08/01/12.The remaider of lungs are grossly clear. There is a left subclavian Port- A cath noted. Diaphragms are flattened consistent w/ COPD. degenrative changes of thoracolumbar spine    H/O chest x-ray 02/18/2013    Lung hyperinflated w/ flattened diaphragms consistent w/ COPD. Cardiac silhoutte at upper limits of normal. Port A Cath overlying left chest that appears to be entering left subclavian vein & ends at cavoatrial junction. Scattered Pulmonary nodules, primarily right hilar region, consistent w/ prio granulomatous infection. Lungs  grossly clear of dominant nodules, infiltrates or [email or web address]    History of PFTs 2014    Moderate to severe obstruction with BD response. positive air trapping, DLCO decreased    History of PFTs 2013    Moderate obstruction, no ABDR    History of PFTs 2012    PFT acceptable and reproducible. Pt given 3 puffs xopenex. Pt had difficult time panting during plethysmography, attempted 2 times,pt  unable to complete. DLCO best effort. Best of pt ability       Allergies   Allergen Reactions    Cipro [Ciprofloxacin Hcl] Rash       Past Surgical History:   Procedure Laterality Date     SECTION      HYSTERECTOMY      Removal of both ovaries-secondary to uterine  cancer       Family History   Problem Relation Age of Onset    Heart disease Mother     Heart disease Father     Hypertension Father        Social History     Socioeconomic History    Marital status: Single   Tobacco Use    Smoking status: Former     Current packs/day: 0.00     Average packs/day: 1 pack/day for 50.0 years (50.0 ttl pk-yrs)     Types: Cigarettes     Start date:      Quit date:      Years since quitting: 15.3    Smokeless tobacco: Never   Substance and Sexual Activity    Alcohol use: No     Comment: PT REPORTS NOT DRINKING SINCE     Drug use: No    Sexual activity: Defer           Objective   Physical Exam  Constitutional:       Appearance: She is well-developed. She is ill-appearing.   HENT:      Head: Normocephalic and atraumatic.   Eyes:      Pupils: Pupils are equal, round, and reactive to light.   Cardiovascular:      Rate and Rhythm: Normal rate.   Pulmonary:      Effort: Accessory muscle usage present.      Breath sounds: Examination of the right-upper field reveals decreased breath sounds. Examination of the left-upper field reveals decreased breath sounds. Examination of the right-middle field reveals decreased breath sounds. Examination of the right-lower field reveals decreased breath sounds.  Examination of the left-lower field reveals decreased breath sounds and rhonchi. Decreased breath sounds present.   Chest:      Chest wall: No mass or tenderness.   Abdominal:      Palpations: Abdomen is soft.   Musculoskeletal:      Cervical back: Normal range of motion.      Right lower leg: 3+ Pitting Edema present.      Left lower leg: 3+ Pitting Edema present.   Skin:     General: Skin is warm and dry.      Capillary Refill: Capillary refill takes 2 to 3 seconds.   Neurological:      General: No focal deficit present.      Mental Status: She is alert and oriented to person, place, and time.         Procedures           ED Course  ED Course as of 05/20/24 1449   Mon May 20, 2024   1049 Was initially evaluated ED room 32, accompanied by her daughter. [JH]   1138 CBC without actionable abnormality [JH]   1200 Patient is being assisted by primary RN to the bathroom for urine specimen. [JH]   1300 Poke to CT technicians, and additional imaging study of the chest with IV contrast has been ordered. [JH]   1310 Patient serum chemistry is resulted, without actionable abnormality. [JH]   1311 Cardiac troponin and BNP nonactionable. [JH]   1334 CT head without acute abnormality. [JH]   1338 Reevaluated the patient and the room 32 with 2 nurses present and her daughter.  We examined her external genitalia she does appear to have bladder prolapse that was reduced without difficulty. [JH]   1408 Urinalysis with small blood but no other actionable abnormality. [JH]   1442 Surgery, Dr. Tariq, on-call has been paged. [JH]   1445 Spoke to Dr. Tariq, conveyed patient's complaints preceding visit today, workup results including CTA result, who recommended outpatient evaluation in the office with Pari Stroud or Mita. [JH]      ED Course User Index  [JH] Timo Sesay, APRN                                             Medical Decision Making  Given the patient's reported symptoms, Varied chronicity, my differential diagnosis  includes CHF exacerbation, COPD exacerbation, cannot exclude pneumonia, acute coronary syndrome, urinary tract infection, viral infection.  Patient will have serum screening labs, urinalysis via straight catheterization, twelve-lead ECG, plain film imaging the chest.    Amount and/or Complexity of Data Reviewed  Labs: ordered.  Radiology: ordered.  ECG/medicine tests: ordered.    Risk  Prescription drug management.        Final diagnoses:   Pulmonary emphysema, unspecified emphysema type   Bladder prolapse, female, acquired   Aneurysm of aortic arch without rupture       ED Disposition  ED Disposition       ED Disposition   Discharge    Condition   Stable    Comment   --               Jason Huang MD  [address] [facility]  [address]  [place] [id number]  [phone]      As needed    Moe Fan MD  [address] [facility]  [address]  [place] [address]  [phone]    Go to   He is follow-up with a cardiothoracic surgery specialist in their offices soon as possible.    Kings Flanagan MD  [facility] [address]  [phone]      The urologist, and schedule an appointment to discuss your bladder prolapse and any potential recommendations.         Medication List        Changed      metoprolol succinate XL 25 MG 24 hr tablet  Commonly known as: TOPROL-XL  Take 1 tablet by mouth Daily.  What changed: how much to take                 Timo Sesay, APRN  05/20/24 1951

## 2024-05-25 LAB
BACTERIA SPEC AEROBE CULT: NORMAL
BACTERIA SPEC AEROBE CULT: NORMAL

## 2024-07-11 ENCOUNTER — TELEPHONE (OUTPATIENT)
Dept: CARDIAC SURGERY | Facility: CLINIC | Age: 89
End: 2024-07-11
Payer: MEDICARE

## 2024-07-11 NOTE — TELEPHONE ENCOUNTER
Lvm to pts daughter concerning late cx appt on 7/9 and wanted to see if she wanted to r/s that marychuy. Told her to give our office a call.

## 2024-08-16 ENCOUNTER — TELEPHONE (OUTPATIENT)
Dept: CARDIAC SURGERY | Facility: CLINIC | Age: 89
End: 2024-08-16
Payer: MEDICARE

## 2024-09-20 ENCOUNTER — TELEPHONE (OUTPATIENT)
Dept: CARDIAC SURGERY | Facility: CLINIC | Age: 89
End: 2024-09-20
Payer: MEDICARE